# Patient Record
Sex: FEMALE
[De-identification: names, ages, dates, MRNs, and addresses within clinical notes are randomized per-mention and may not be internally consistent; named-entity substitution may affect disease eponyms.]

---

## 2017-01-18 ENCOUNTER — APPOINTMENT (OUTPATIENT)
Dept: FAMILY MEDICINE | Facility: CLINIC | Age: 47
End: 2017-01-18

## 2017-01-18 DIAGNOSIS — L03.119 CELLULITIS OF UNSPECIFIED PART OF LIMB: ICD-10-CM

## 2017-01-18 DIAGNOSIS — Z87.2 PERSONAL HISTORY OF DISEASES OF THE SKIN AND SUBCUTANEOUS TISSUE: ICD-10-CM

## 2017-02-24 ENCOUNTER — APPOINTMENT (OUTPATIENT)
Dept: FAMILY MEDICINE | Facility: CLINIC | Age: 47
End: 2017-02-24

## 2017-02-24 VITALS
HEIGHT: 65 IN | DIASTOLIC BLOOD PRESSURE: 72 MMHG | BODY MASS INDEX: 24.16 KG/M2 | SYSTOLIC BLOOD PRESSURE: 100 MMHG | WEIGHT: 145 LBS

## 2018-01-12 ENCOUNTER — RESULT REVIEW (OUTPATIENT)
Age: 48
End: 2018-01-12

## 2019-03-29 ENCOUNTER — RESULT REVIEW (OUTPATIENT)
Age: 49
End: 2019-03-29

## 2020-02-06 ENCOUNTER — RESULT REVIEW (OUTPATIENT)
Age: 50
End: 2020-02-06

## 2020-11-13 ENCOUNTER — APPOINTMENT (OUTPATIENT)
Dept: SURGICAL ONCOLOGY | Facility: CLINIC | Age: 50
End: 2020-11-13
Payer: COMMERCIAL

## 2020-11-13 VITALS
OXYGEN SATURATION: 98 % | HEIGHT: 65 IN | HEART RATE: 88 BPM | BODY MASS INDEX: 23.32 KG/M2 | WEIGHT: 140 LBS | SYSTOLIC BLOOD PRESSURE: 123 MMHG | DIASTOLIC BLOOD PRESSURE: 80 MMHG

## 2020-11-13 DIAGNOSIS — N63.20 UNSPECIFIED LUMP IN THE LEFT BREAST, UNSPECIFIED QUADRANT: ICD-10-CM

## 2020-11-13 PROCEDURE — 99244 OFF/OP CNSLTJ NEW/EST MOD 40: CPT

## 2020-11-13 PROCEDURE — 99072 ADDL SUPL MATRL&STAF TM PHE: CPT

## 2020-11-13 NOTE — HISTORY OF PRESENT ILLNESS
[de-identified] : Ms. Cintron is a 49 y/o female who presents for an initial consultation. Pt c/o of left breast pain. \par \par Pathology report from 2/21/20 results: \par BREAST, LEFT, INFERIOR CENTRAL. ANTERIOR DEPTH CORE BIOPSY.\par -Pseudoangiomatous stromal hyperplasia \par -Fibrocystic change\par -Fibroadenomatoid change\par \par Recent mammogram from 9/3/20 revealed no significant masses, calcifications, or other findings in the left breast. A metallic marker is noted in the left central breast. anterior third at the site of a prior benign biopsy.\par \par PMHX:\par Family history of breast cancer: aunt\par LMP: 11/11/20\par Today the pt is doing well overall. Pt c/o of left breast pain. Denies fever, chills. No nipple discharge. \par \par Mikhail Cormier\par [address] [facility]\par [address]\par [facility] [id number]\par

## 2020-11-13 NOTE — ASSESSMENT
[FreeTextEntry1] : S/p left breast biopsy \par February 2020 for PASH \par Negative B-RADS 2\par 6 month follow up left mammogram September 2020\par Left retroareolar left breast likely post biopsy scarring\par Will get official left breast US\par Recommend vitamin e and/or primrose prn for breast pain\par RTO 1 month

## 2020-11-13 NOTE — CONSULT LETTER
[Dear  ___] : Dear  [unfilled], [Consult Letter:] : I had the pleasure of evaluating your patient, [unfilled]. [Please see my note below.] : Please see my note below. [Sincerely,] : Sincerely, [FreeTextEntry3] : Emery Rocha MD FACS\par

## 2020-11-13 NOTE — ADDENDUM
[FreeTextEntry1] : I, Aundrea Villarreal, acted solely as a scribe for Dr. Emery Rocha on this date 11/13/2020.\par

## 2020-11-13 NOTE — PHYSICAL EXAM
[Normal] : supple, no neck mass and thyroid not enlarged [Normal Neck Lymph Nodes] : normal neck lymph nodes  [Normal Supraclavicular Lymph Nodes] : normal supraclavicular lymph nodes [Normal Groin Lymph Nodes] : normal groin lymph nodes [Normal Axillary Lymph Nodes] : normal axillary lymph nodes [Normal] : oriented to person, place and time, with appropriate affect [de-identified] : tender nodularity left retroareolar breast. US shows post op changes w/o any suspicious masses. no dominant masses adenopathy or bilaterally.

## 2020-11-20 ENCOUNTER — OUTPATIENT (OUTPATIENT)
Dept: OUTPATIENT SERVICES | Facility: HOSPITAL | Age: 50
LOS: 1 days | End: 2020-11-20
Payer: COMMERCIAL

## 2020-11-20 ENCOUNTER — RESULT REVIEW (OUTPATIENT)
Age: 50
End: 2020-11-20

## 2020-11-20 DIAGNOSIS — C50.919 MALIGNANT NEOPLASM OF UNSPECIFIED SITE OF UNSPECIFIED FEMALE BREAST: ICD-10-CM

## 2020-11-20 PROCEDURE — 88321 CONSLTJ&REPRT SLD PREP ELSWR: CPT

## 2020-11-22 LAB — SURGICAL PATHOLOGY STUDY: SIGNIFICANT CHANGE UP

## 2021-01-28 ENCOUNTER — APPOINTMENT (OUTPATIENT)
Dept: FAMILY MEDICINE | Facility: CLINIC | Age: 51
End: 2021-01-28
Payer: COMMERCIAL

## 2021-01-28 VITALS
DIASTOLIC BLOOD PRESSURE: 78 MMHG | HEART RATE: 84 BPM | HEIGHT: 65 IN | RESPIRATION RATE: 16 BRPM | TEMPERATURE: 98.1 F | BODY MASS INDEX: 23.16 KG/M2 | OXYGEN SATURATION: 98 % | SYSTOLIC BLOOD PRESSURE: 111 MMHG | WEIGHT: 139 LBS

## 2021-01-28 DIAGNOSIS — Z00.00 ENCOUNTER FOR GENERAL ADULT MEDICAL EXAMINATION W/OUT ABNORMAL FINDINGS: ICD-10-CM

## 2021-01-28 PROCEDURE — 99396 PREV VISIT EST AGE 40-64: CPT | Mod: 25

## 2021-01-28 PROCEDURE — 99072 ADDL SUPL MATRL&STAF TM PHE: CPT

## 2021-01-28 PROCEDURE — 36415 COLL VENOUS BLD VENIPUNCTURE: CPT

## 2021-02-09 ENCOUNTER — APPOINTMENT (OUTPATIENT)
Dept: ULTRASOUND IMAGING | Facility: IMAGING CENTER | Age: 51
End: 2021-02-09

## 2021-02-09 ENCOUNTER — APPOINTMENT (OUTPATIENT)
Dept: MAMMOGRAPHY | Facility: IMAGING CENTER | Age: 51
End: 2021-02-09

## 2021-03-15 ENCOUNTER — APPOINTMENT (OUTPATIENT)
Dept: FAMILY MEDICINE | Facility: CLINIC | Age: 51
End: 2021-03-15
Payer: COMMERCIAL

## 2021-03-15 PROCEDURE — 99213 OFFICE O/P EST LOW 20 MIN: CPT | Mod: 95

## 2021-04-25 ENCOUNTER — RESULT REVIEW (OUTPATIENT)
Age: 51
End: 2021-04-25

## 2021-05-08 ENCOUNTER — TRANSCRIPTION ENCOUNTER (OUTPATIENT)
Age: 51
End: 2021-05-08

## 2021-06-17 ENCOUNTER — TRANSCRIPTION ENCOUNTER (OUTPATIENT)
Age: 51
End: 2021-06-17

## 2022-04-10 ENCOUNTER — RESULT REVIEW (OUTPATIENT)
Age: 52
End: 2022-04-10

## 2022-05-10 ENCOUNTER — NON-APPOINTMENT (OUTPATIENT)
Age: 52
End: 2022-05-10

## 2022-07-20 ENCOUNTER — APPOINTMENT (OUTPATIENT)
Dept: FAMILY MEDICINE | Facility: CLINIC | Age: 52
End: 2022-07-20

## 2022-07-20 VITALS
HEIGHT: 65 IN | BODY MASS INDEX: 23.99 KG/M2 | OXYGEN SATURATION: 97 % | TEMPERATURE: 97.6 F | HEART RATE: 78 BPM | SYSTOLIC BLOOD PRESSURE: 121 MMHG | DIASTOLIC BLOOD PRESSURE: 83 MMHG | WEIGHT: 144 LBS

## 2022-07-20 PROCEDURE — 99213 OFFICE O/P EST LOW 20 MIN: CPT

## 2022-07-20 RX ORDER — CHLORHEXIDINE GLUCONATE, 0.12% ORAL RINSE 1.2 MG/ML
0.12 SOLUTION DENTAL
Qty: 473 | Refills: 0 | Status: DISCONTINUED | COMMUNITY
Start: 2022-06-16

## 2022-07-20 RX ORDER — NALTREXONE HYDROCHLORIDE 50 MG/1
50 TABLET, FILM COATED ORAL TWICE DAILY
Qty: 180 | Refills: 0 | Status: DISCONTINUED | COMMUNITY
Start: 2021-01-28 | End: 2022-07-20

## 2022-07-20 RX ORDER — PREDNISONE 10 MG/1
10 TABLET ORAL
Qty: 10 | Refills: 0 | Status: DISCONTINUED | COMMUNITY
Start: 2017-02-24 | End: 2022-07-20

## 2022-07-20 RX ORDER — AZITHROMYCIN 250 MG/1
250 TABLET, FILM COATED ORAL
Qty: 1 | Refills: 0 | Status: DISCONTINUED | COMMUNITY
Start: 2021-03-15 | End: 2022-07-20

## 2022-07-20 NOTE — HISTORY OF PRESENT ILLNESS
[FreeTextEntry8] : Here for sinus pressure, sore throat, enlarged lymph node. \par Both ears bothering her.  \par Pain in throat. \par COVID negative test-3 days ago. \par 1.5-2 weeks of symptoms. \par \par Similar symptoms in May. \par \par Had dental extraction in December. Had dental imaging done and was advised that sinuses were inflammed at the time. \par Had implant 1.5 month ago.  No recent blood or dental pain. \par \par Medications and allergies reviewed.\par

## 2022-07-20 NOTE — PLAN
[FreeTextEntry1] : No sign of dental infection.  Seeing dental next week. \par Sinusitis and inflammed lymph node-start Augmentin-flonase. \par Mucinex for cough.\par Advised to avoid any spicy, greasy, acidic foods that may be irritating to stomach.\par \par Advised in lymph node does not reduce/improve will need follow-up in a few weeks in the office for re-evaluation. \par \par Discussed if recurrent sinus infections, consider ENT evaluation.  \par \par Patient expressed understanding of plan.

## 2022-07-20 NOTE — REVIEW OF SYSTEMS
[Earache] : earache [Sore Throat] : sore throat [Cough] : cough [Negative] : Genitourinary [Fever] : no fever [Shortness Of Breath] : no shortness of breath [Wheezing] : no wheezing [Nausea] : no nausea [Constipation] : no constipation [Vomiting] : no vomiting [FreeTextEntry6] : mucus [FreeTextEntry7] : diarrhea-3 times  [de-identified] : Felt lightheaded few days ago

## 2022-07-20 NOTE — PHYSICAL EXAM
[Normal Oropharynx] : the oropharynx was normal [No Edema] : there was no peripheral edema [No Extremity Clubbing/Cyanosis] : no extremity clubbing/cyanosis [Normal] : affect was normal and insight and judgment were intact [de-identified] : hazy TMs, no erythema.  Maxillary sinus tenderness to palpation [de-identified] : enlarged lymph node right neck

## 2022-08-09 ENCOUNTER — APPOINTMENT (OUTPATIENT)
Dept: FAMILY MEDICINE | Facility: CLINIC | Age: 52
End: 2022-08-09

## 2022-08-09 VITALS
SYSTOLIC BLOOD PRESSURE: 137 MMHG | BODY MASS INDEX: 24.16 KG/M2 | OXYGEN SATURATION: 99 % | HEART RATE: 75 BPM | HEIGHT: 65 IN | DIASTOLIC BLOOD PRESSURE: 90 MMHG | WEIGHT: 145 LBS | TEMPERATURE: 98 F

## 2022-08-09 DIAGNOSIS — R19.5 OTHER FECAL ABNORMALITIES: ICD-10-CM

## 2022-08-09 PROCEDURE — 36415 COLL VENOUS BLD VENIPUNCTURE: CPT

## 2022-08-09 PROCEDURE — 99214 OFFICE O/P EST MOD 30 MIN: CPT | Mod: 25

## 2022-08-09 RX ORDER — FLUTICASONE PROPIONATE 50 UG/1
50 SPRAY, METERED NASAL TWICE DAILY
Qty: 1 | Refills: 0 | Status: DISCONTINUED | COMMUNITY
Start: 2022-07-20 | End: 2022-08-09

## 2022-08-09 RX ORDER — AMOXICILLIN AND CLAVULANATE POTASSIUM 875; 125 MG/1; MG/1
875-125 TABLET, COATED ORAL
Qty: 14 | Refills: 0 | Status: DISCONTINUED | COMMUNITY
Start: 2017-02-24 | End: 2022-08-09

## 2022-08-09 NOTE — PLAN
[FreeTextEntry1] : Will follow up labwork drawn in office today.\par \par Neck-eval with US; ENT evaluation. \par \par Loose Stools-keep food journal; Gastro follow-up if not improving. \par \par Advised to call if any changes. \par

## 2022-08-09 NOTE — PHYSICAL EXAM
[Normal Oropharynx] : the oropharynx was normal [Normal TMs] : both tympanic membranes were normal [No Edema] : there was no peripheral edema [No Extremity Clubbing/Cyanosis] : no extremity clubbing/cyanosis [Normal] : affect was normal and insight and judgment were intact [de-identified] : palpable bump right lateral neck; nontender to palpation

## 2022-08-09 NOTE — REVIEW OF SYSTEMS
[Negative] : Genitourinary [Fever] : no fever [Chills] : no chills [Vomiting] : no vomiting [Heartburn] : no heartburn [Headache] : no headache [Dizziness] : no dizziness [FreeTextEntry4] : throat feels tight in the AM.  Denies any AM sinus congestion. [FreeTextEntry7] : still some episodes of diarrhea; bloated

## 2022-08-09 NOTE — HISTORY OF PRESENT ILLNESS
[FreeTextEntry1] : Here for follow-up [de-identified] : Here for follow-up\par Still noticing bump on neck; URI symptoms have improved. \par \par HM-Had colonoscopy-~  2 years ago. \par Diarrhea for 1 month. 4 days a week; multiple bowel movements in a day when it happens.  Feeling bloated for a long time.  \par Has been using nicorette gum.\par \par Saw Gyn 6 months ago.  Perimenopausal.  Periods are skipping. \par LMP-May 2022. \par \par Had full labwork through work in September. \par

## 2022-08-11 DIAGNOSIS — R79.89 OTHER SPECIFIED ABNORMAL FINDINGS OF BLOOD CHEMISTRY: ICD-10-CM

## 2022-08-11 LAB
ALBUMIN SERPL ELPH-MCNC: 4.4 G/DL
ALP BLD-CCNC: 131 U/L
ALT SERPL-CCNC: 37 U/L
ANION GAP SERPL CALC-SCNC: 15 MMOL/L
AST SERPL-CCNC: 65 U/L
BASOPHILS # BLD AUTO: 0.03 K/UL
BASOPHILS NFR BLD AUTO: 0.7 %
BILIRUB SERPL-MCNC: 0.4 MG/DL
BUN SERPL-MCNC: 6 MG/DL
CALCIUM SERPL-MCNC: 10.2 MG/DL
CHLORIDE SERPL-SCNC: 100 MMOL/L
CO2 SERPL-SCNC: 24 MMOL/L
CREAT SERPL-MCNC: 0.68 MG/DL
EGFR: 105 ML/MIN/1.73M2
EOSINOPHIL # BLD AUTO: 0.08 K/UL
EOSINOPHIL NFR BLD AUTO: 1.9 %
GLUCOSE SERPL-MCNC: 97 MG/DL
HCT VFR BLD CALC: 41 %
HGB BLD-MCNC: 13.1 G/DL
IMM GRANULOCYTES NFR BLD AUTO: 0.2 %
LYMPHOCYTES # BLD AUTO: 1.42 K/UL
LYMPHOCYTES NFR BLD AUTO: 34.1 %
MAN DIFF?: NORMAL
MCHC RBC-ENTMCNC: 32 GM/DL
MCHC RBC-ENTMCNC: 33.8 PG
MCV RBC AUTO: 105.7 FL
MONOCYTES # BLD AUTO: 0.43 K/UL
MONOCYTES NFR BLD AUTO: 10.3 %
NEUTROPHILS # BLD AUTO: 2.19 K/UL
NEUTROPHILS NFR BLD AUTO: 52.8 %
PLATELET # BLD AUTO: 271 K/UL
POTASSIUM SERPL-SCNC: 4 MMOL/L
PROT SERPL-MCNC: 7.6 G/DL
RBC # BLD: 3.88 M/UL
RBC # FLD: 13 %
SODIUM SERPL-SCNC: 139 MMOL/L
TSH SERPL-ACNC: 1.79 UIU/ML
WBC # FLD AUTO: 4.16 K/UL

## 2022-08-29 ENCOUNTER — NON-APPOINTMENT (OUTPATIENT)
Age: 52
End: 2022-08-29

## 2022-08-29 DIAGNOSIS — E04.1 NONTOXIC SINGLE THYROID NODULE: ICD-10-CM

## 2022-08-29 DIAGNOSIS — R22.1 LOCALIZED SWELLING, MASS AND LUMP, NECK: ICD-10-CM

## 2022-09-13 ENCOUNTER — APPOINTMENT (OUTPATIENT)
Dept: FAMILY MEDICINE | Facility: CLINIC | Age: 52
End: 2022-09-13

## 2023-08-22 ENCOUNTER — APPOINTMENT (OUTPATIENT)
Dept: FAMILY MEDICINE | Facility: CLINIC | Age: 53
End: 2023-08-22
Payer: COMMERCIAL

## 2023-08-22 VITALS — SYSTOLIC BLOOD PRESSURE: 120 MMHG | OXYGEN SATURATION: 100 % | DIASTOLIC BLOOD PRESSURE: 75 MMHG | HEART RATE: 66 BPM

## 2023-08-22 VITALS — BODY MASS INDEX: 23.21 KG/M2 | WEIGHT: 139.5 LBS

## 2023-08-22 DIAGNOSIS — Z29.8 ENCOUNTER FOR OTHER SPECIFIED PROPHYLACTIC MEASURES: ICD-10-CM

## 2023-08-22 PROCEDURE — 99213 OFFICE O/P EST LOW 20 MIN: CPT | Mod: 25

## 2023-08-22 RX ORDER — ATOVAQUONE AND PROGUANIL HYDROCHLORIDE 250; 100 MG/1; MG/1
250-100 TABLET, FILM COATED ORAL DAILY
Qty: 14 | Refills: 0 | Status: ACTIVE | COMMUNITY
Start: 2023-08-22 | End: 1900-01-01

## 2023-08-22 NOTE — PLAN
[FreeTextEntry1] : 1. viral syndrome  - check MMRV hepatitis titers - call with results  - given information for [facility] travel clinic   2. need for malaria prophylaxis  - start atovaquine-progranil 1-2 days prior to leaving for the duration of the stay and 7 days after leaving

## 2023-08-22 NOTE — HISTORY OF PRESENT ILLNESS
[FreeTextEntry1] : travel vaccinations  [de-identified] : 53 yo F here for travel vaccination. she is going to [facility] 9/15. she feels well. had a slip and fall sprained her left wrist.

## 2023-08-23 ENCOUNTER — APPOINTMENT (OUTPATIENT)
Dept: FAMILY MEDICINE | Facility: CLINIC | Age: 53
End: 2023-08-23
Payer: COMMERCIAL

## 2023-08-23 VITALS
TEMPERATURE: 98.1 F | HEART RATE: 67 BPM | OXYGEN SATURATION: 100 % | BODY MASS INDEX: 23.35 KG/M2 | HEIGHT: 65 IN | SYSTOLIC BLOOD PRESSURE: 132 MMHG | WEIGHT: 140.13 LBS | DIASTOLIC BLOOD PRESSURE: 84 MMHG

## 2023-08-23 DIAGNOSIS — Z86.19 PERSONAL HISTORY OF OTHER INFECTIOUS AND PARASITIC DISEASES: ICD-10-CM

## 2023-08-23 DIAGNOSIS — Z86.79 PERSONAL HISTORY OF OTHER DISEASES OF THE CIRCULATORY SYSTEM: ICD-10-CM

## 2023-08-23 DIAGNOSIS — Z87.09 PERSONAL HISTORY OF OTHER DISEASES OF THE RESPIRATORY SYSTEM: ICD-10-CM

## 2023-08-23 DIAGNOSIS — Z23 ENCOUNTER FOR IMMUNIZATION: ICD-10-CM

## 2023-08-23 DIAGNOSIS — M79.602 PAIN IN LEFT ARM: ICD-10-CM

## 2023-08-23 LAB
HBV SURFACE AG SER QL: NONREACTIVE
HEPATITIS A IGG ANTIBODY: NONREACTIVE
MEV IGG FLD QL IA: 91.9 AU/ML
MEV IGG+IGM SER-IMP: POSITIVE
MUV AB SER-ACNC: POSITIVE
MUV IGG SER QL IA: 134 AU/ML
RUBV IGG FLD-ACNC: 14.9 INDEX
RUBV IGG SER-IMP: POSITIVE
VZV AB TITR SER: POSITIVE
VZV IGG SER IF-ACNC: >4000 INDEX

## 2023-08-23 PROCEDURE — 90632 HEPA VACCINE ADULT IM: CPT

## 2023-08-23 PROCEDURE — 99213 OFFICE O/P EST LOW 20 MIN: CPT | Mod: 25

## 2023-08-23 PROCEDURE — 90739 HEPB VACC 2/4 DOSE ADULT IM: CPT

## 2023-08-23 PROCEDURE — 90471 IMMUNIZATION ADMIN: CPT

## 2023-08-23 PROCEDURE — G0010: CPT | Mod: 59

## 2023-08-23 PROCEDURE — 90472 IMMUNIZATION ADMIN EACH ADD: CPT

## 2023-08-23 NOTE — PHYSICAL EXAM
[No Acute Distress] : no acute distress [Normal Sclera/Conjunctiva] : normal sclera/conjunctiva [EOMI] : extraocular movements intact [Normal Outer Ear/Nose] : the outer ears and nose were normal in appearance [No JVD] : no jugular venous distention [No Respiratory Distress] : no respiratory distress  [No Accessory Muscle Use] : no accessory muscle use [Coordination Grossly Intact] : coordination grossly intact [Normal Affect] : the affect was normal [Alert and Oriented x3] : oriented to person, place, and time [Normal Insight/Judgement] : insight and judgment were intact [de-identified] : left cast on to DONYA

## 2023-08-23 NOTE — HISTORY OF PRESENT ILLNESS
[Spouse] : spouse [FreeTextEntry1] : here for hep A and Hep B vaccines traveling to [facility] 9/28 for 1 1/2 wk

## 2024-09-12 ENCOUNTER — NON-APPOINTMENT (OUTPATIENT)
Age: 54
End: 2024-09-12